# Patient Record
Sex: FEMALE | Race: WHITE | NOT HISPANIC OR LATINO | Employment: OTHER | URBAN - METROPOLITAN AREA
[De-identification: names, ages, dates, MRNs, and addresses within clinical notes are randomized per-mention and may not be internally consistent; named-entity substitution may affect disease eponyms.]

---

## 2024-07-19 ENCOUNTER — HOSPITAL ENCOUNTER (EMERGENCY)
Facility: CLINIC | Age: 69
Discharge: HOME | End: 2024-07-20
Attending: EMERGENCY MEDICINE | Admitting: EMERGENCY MEDICINE
Payer: MEDICARE

## 2024-07-19 VITALS
HEIGHT: 67 IN | WEIGHT: 200.18 LBS | HEART RATE: 104 BPM | SYSTOLIC BLOOD PRESSURE: 143 MMHG | OXYGEN SATURATION: 95 % | RESPIRATION RATE: 18 BRPM | TEMPERATURE: 98.5 F | BODY MASS INDEX: 31.42 KG/M2 | DIASTOLIC BLOOD PRESSURE: 82 MMHG

## 2024-07-19 DIAGNOSIS — N30.00 ACUTE CYSTITIS WITHOUT HEMATURIA: ICD-10-CM

## 2024-07-19 DIAGNOSIS — T50.905A MEDICATION REACTION, INITIAL ENCOUNTER: ICD-10-CM

## 2024-07-19 PROCEDURE — 99283 EMERGENCY DEPT VISIT LOW MDM: CPT

## 2024-07-19 RX ORDER — NITROFURANTOIN 25; 75 MG/1; MG/1
100 CAPSULE ORAL 2 TIMES DAILY
Qty: 14 CAPSULE | Refills: 0 | Status: SHIPPED | OUTPATIENT
Start: 2024-07-19

## 2024-07-19 ASSESSMENT — ACTIVITIES OF DAILY LIVING (ADL)
ADLS_ACUITY_SCORE: 33
ADLS_ACUITY_SCORE: 35

## 2024-07-19 ASSESSMENT — COLUMBIA-SUICIDE SEVERITY RATING SCALE - C-SSRS
2. HAVE YOU ACTUALLY HAD ANY THOUGHTS OF KILLING YOURSELF IN THE PAST MONTH?: NO
1. IN THE PAST MONTH, HAVE YOU WISHED YOU WERE DEAD OR WISHED YOU COULD GO TO SLEEP AND NOT WAKE UP?: NO
6. HAVE YOU EVER DONE ANYTHING, STARTED TO DO ANYTHING, OR PREPARED TO DO ANYTHING TO END YOUR LIFE?: NO

## 2024-07-19 NOTE — ED TRIAGE NOTES
Pt presents to the ED with complaint  of itching all over after taking a sulfa antibiotic an hour ago. Pt on antibiotic for UTI and started it today. Pt denies rash or breathing problems.

## 2024-07-19 NOTE — ED PROVIDER NOTES
"  Emergency Department Note      History of Present Illness     Chief Complaint   Medication Reaction      HPI   Virginia Ferrer is a 69 year old female with a history of CVA, hypertension, and type 2 diabetes mellitus who presents to the ED with a medication reaction. The patient reports she was prescribed Bactrim for a UTI at her yearly check-up. She took one dose today and endorses itching in arms and legs since. She endorses dysuria with UTI. She denies rash.     Independent Historian   None      Past Medical History     Medical History and Problem List   CVA  Hypothyroidism  Hypertension  Chronic pain of right knee  Type 2 diabetes mellitus without complication, without long-term current use of insulin    Medications   Empagliflozin  Sulfamethoxazole-trimethoprim  Amlodipine  Glimepiride  Hydralazine  Levothyroxine  Metoprolol succinate  Rosuvastatin    Surgical History   Gastric Bypass    Physical Exam     Patient Vitals for the past 24 hrs:   BP Temp Temp src Pulse Resp SpO2 Height Weight   07/19/24 1742 (!) 143/82 98.5  F (36.9  C) Temporal 104 18 95 % 1.702 m (5' 7\") 90.8 kg (200 lb 2.8 oz)     Physical Exam  General: Patient is alert and cooperative.  HENT:  No lip or tongue swelling.   Neck:  Normal range of motion and appearance.   Cardiovascular:  Normal rate.   Pulmonary/Chest:  Effort normal.   Musculoskeletal: Normal range of motion. No edema or tenderness.  Skin: Warm and dry. No rash.   Psychiatric: Normal mood and affect. Normal behavior and judgement.      Diagnostics     Lab Results   Labs Ordered and Resulted from Time of ED Arrival to Time of ED Departure - No data to display    Imaging   No orders to display       Independent Interpretation   None    ED Course      Medications Administered   Medications - No data to display      Discussion of Management   None    ED Course   ED Course as of 07/19/24 1855 Fri Jul 19, 2024 1813 I obtained history and examined the patient as noted above. "       Optional/Additional Documentation  None    Medical Decision Making / Diagnosis     CMS Diagnoses: None    MIPS       None    MDM   Virginia Ferrer is a 69 year old female with acute diffuse pruritus with no associated rash.  This began after taking her first dose of Bactrim, given for a recently diagnosed urinary tract infection.  She has had no fever or flank pain and has an unremarkable exam.  No medical interventions were necessary.  Plans are to discontinue Bactrim and prescribe a 7-day course of Macrobid.  She has a history of what sounds like near anaphylaxis with penicillins so cephalosporins are relatively contraindicated.  Care everywhere was reviewed, there is a urine culture pending and she does have normal renal function.    Disposition   The patient was discharged.     Diagnosis     ICD-10-CM    1. Medication reaction, initial encounter  T50.905A       2. Acute cystitis without hematuria  N30.00            Discharge Medications   New Prescriptions    NITROFURANTOIN MACROCRYSTAL-MONOHYDRATE (MACROBID) 100 MG CAPSULE    Take 1 capsule (100 mg) by mouth 2 times daily         Scribe Disclosure:  I, Subhash Lacy, am serving as a scribe at 6:54 PM on 7/19/2024 to document services personally performed by Kenny Leonard MD based on my observations and the provider's statements to me.        Kenny Leonard MD  07/19/24 2050

## 2024-07-20 ASSESSMENT — ACTIVITIES OF DAILY LIVING (ADL)
ADLS_ACUITY_SCORE: 35
ADLS_ACUITY_SCORE: 35

## 2024-10-16 ENCOUNTER — HOSPITAL ENCOUNTER (EMERGENCY)
Facility: CLINIC | Age: 69
Discharge: HOME | End: 2024-10-16
Attending: EMERGENCY MEDICINE | Admitting: EMERGENCY MEDICINE
Payer: MEDICARE

## 2024-10-16 VITALS
WEIGHT: 193.56 LBS | RESPIRATION RATE: 20 BRPM | HEIGHT: 67 IN | DIASTOLIC BLOOD PRESSURE: 91 MMHG | SYSTOLIC BLOOD PRESSURE: 149 MMHG | OXYGEN SATURATION: 100 % | HEART RATE: 99 BPM | BODY MASS INDEX: 30.38 KG/M2 | TEMPERATURE: 97.5 F

## 2024-10-16 DIAGNOSIS — N30.01 ACUTE CYSTITIS WITH HEMATURIA: ICD-10-CM

## 2024-10-16 LAB
ALBUMIN UR-MCNC: 200 MG/DL
APPEARANCE UR: ABNORMAL
BILIRUB UR QL STRIP: NEGATIVE
COLOR UR AUTO: YELLOW
GLUCOSE UR STRIP-MCNC: >=1000 MG/DL
HGB UR QL STRIP: ABNORMAL
KETONES UR STRIP-MCNC: NEGATIVE MG/DL
LEUKOCYTE ESTERASE UR QL STRIP: ABNORMAL
NITRATE UR QL: NEGATIVE
PH UR STRIP: 5.5 [PH] (ref 5–7)
RBC URINE: 37 /HPF
SP GR UR STRIP: 1.02 (ref 1–1.03)
SQUAMOUS EPITHELIAL: 9 /HPF
UROBILINOGEN UR STRIP-MCNC: NORMAL MG/DL
WBC CLUMPS #/AREA URNS HPF: PRESENT /HPF
WBC URINE: >182 /HPF

## 2024-10-16 PROCEDURE — 81001 URINALYSIS AUTO W/SCOPE: CPT | Performed by: EMERGENCY MEDICINE

## 2024-10-16 PROCEDURE — 99283 EMERGENCY DEPT VISIT LOW MDM: CPT

## 2024-10-16 PROCEDURE — 250N000013 HC RX MED GY IP 250 OP 250 PS 637: Performed by: EMERGENCY MEDICINE

## 2024-10-16 PROCEDURE — 87181 SC STD AGAR DILUTION PER AGT: CPT | Performed by: EMERGENCY MEDICINE

## 2024-10-16 RX ORDER — CIPROFLOXACIN 500 MG/1
500 TABLET, FILM COATED ORAL 2 TIMES DAILY
Qty: 6 TABLET | Refills: 0 | Status: SHIPPED | OUTPATIENT
Start: 2024-10-16 | End: 2024-10-19

## 2024-10-16 RX ORDER — SULFAMETHOXAZOLE AND TRIMETHOPRIM 800; 160 MG/1; MG/1
1 TABLET ORAL 2 TIMES DAILY
Qty: 14 TABLET | Refills: 0 | Status: SHIPPED | OUTPATIENT
Start: 2024-10-16 | End: 2024-10-16

## 2024-10-16 RX ORDER — SULFAMETHOXAZOLE AND TRIMETHOPRIM 800; 160 MG/1; MG/1
1 TABLET ORAL ONCE
Status: COMPLETED | OUTPATIENT
Start: 2024-10-16 | End: 2024-10-16

## 2024-10-16 RX ADMIN — SULFAMETHOXAZOLE AND TRIMETHOPRIM 1 TABLET: 800; 160 TABLET ORAL at 17:57

## 2024-10-16 ASSESSMENT — COLUMBIA-SUICIDE SEVERITY RATING SCALE - C-SSRS
6. HAVE YOU EVER DONE ANYTHING, STARTED TO DO ANYTHING, OR PREPARED TO DO ANYTHING TO END YOUR LIFE?: NO
1. IN THE PAST MONTH, HAVE YOU WISHED YOU WERE DEAD OR WISHED YOU COULD GO TO SLEEP AND NOT WAKE UP?: NO
2. HAVE YOU ACTUALLY HAD ANY THOUGHTS OF KILLING YOURSELF IN THE PAST MONTH?: NO

## 2024-10-16 ASSESSMENT — ACTIVITIES OF DAILY LIVING (ADL): ADLS_ACUITY_SCORE: 35

## 2024-10-16 NOTE — ED PROVIDER NOTES
"Emergency Department Note      Code Status: No Order    History of Present Illness     Chief Complaint:  Abdominal Pain and Dysuria       HPI   Virginia Ferrer is a 69 year old female with a history of hypertension, hypothyroidism, and type II diabetes who presents for an evaluation of abdominal pain and dysuria. The patient reported that she had been experiencing dysuria for the past three. Specifically characterizes her dysuria as a burning sensation. She observed blood when using the restroom this morning, but is unsure if it originates in the stool or urine. She states experiencing pain in the central quadrant of her abdomen as well as her back. Further notes feeling constipated, with her last bowel movement occurring 4 days ago. The patient took a stool softener with no relief. Denies fevers, nausea, vomiting, or diarrhea.    Independent Historian:    Son -he notes that the patient does live with him but she had not specifically mentioned discomfort with urination to him until today.    Review of External Notes  None    Past Medical History   Medical History, Surgical History, Problem List, and Medications  Reviewed in Epic    Physical Exam   Patient Vitals for the past 24 hrs:   BP Temp Temp src Pulse Resp SpO2 Height Weight   10/16/24 1622 (!) 149/91 97.5  F (36.4  C) Temporal 99 20 100 % 1.689 m (5' 6.5\") 87.8 kg (193 lb 9 oz)       Physical Exam  Constitutional: Vital signs reviewed as above.   Eyes: PEERL, EOMI B/L  Neck: No JVD noted. FROM   Cardiovascular: normal rate, Regular rhythm and normal heart sounds.  No murmur heard. Equal B/L peripheral pulses.  Pulmonary/Chest: Effort normal and breath sounds normal. No respiratory distress. Patient has no wheezes. Patient has no rales.   Gastrointestinal: Soft. There is suprapubic tenderness.   Musculoskeletal/Extremities: No pitting edema noted. Normal tone.  Skin: Skin is warm and dry. There is no diaphoresis noted.   Psychiatric: The patient appears " calm.     Diagnostics     Laboratory: Imaging:   Labs Ordered and Resulted from Time of ED Arrival to Time of ED Departure   ROUTINE UA WITH MICROSCOPIC REFLEX TO CULTURE - Abnormal       Result Value    Color Urine Yellow      Appearance Urine Cloudy (*)     Glucose Urine >=1000 (*)     Bilirubin Urine Negative      Ketones Urine Negative      Specific Gravity Urine 1.020      Blood Urine Moderate (*)     pH Urine 5.5      Protein Albumin Urine 200 (*)     Urobilinogen Urine Normal      Nitrite Urine Negative      Leukocyte Esterase Urine Large (*)     WBC Clumps Urine Present (*)     RBC Urine 37 (*)     WBC Urine >182 (*)     Squamous Epithelials Urine 9 (*)    URINE CULTURE     No orders to display         Independent Interpretation  See ED course    ED Course    Medications Administered  Medications   sulfamethoxazole-trimethoprim (BACTRIM DS) 800-160 MG per tablet 1 tablet (1 tablet Oral $Given 10/16/24 5783)       Procedures  Procedures     Discussion of Management  See ED Course    ED Course  ED Course as of 10/16/24 2342   Wed Oct 16, 2024   1714 I obtained the history and evaluated the patient as noted above.    1846 The patient's son called noting the patient feels itchy. She was given a Bactrim dose here.  I instructed her to take Benadryl.  She can return as needed.  I will change the antibiotic to cipro given the listed allergy to penicillin as well as Macrobid.  The patient's son notes she is tolerated Cipro in the past.       Optional/Additional Documentation: None    Medical Decision Making / Diagnosis     MIPS     None    Medical Decision Making:  This 69-year-old presents due to dysuria.  Please see the HPI and exam for specifics.  The patient denies fevers.  There are no physical exam signs of pyelonephritis.  Urinalysis is concerning for infection and I will prescribe antibiotics.  The first dose will be given in the ED.  I think that other intra-abdominal abnormalities are unlikely.  There is  no significant right lower or left lower quadrant pain.  The patient does note some constipation and has now started taking stool softeners.    I think she can safely be discharged to follow-up with primary care.  The patient and her son note that there seems to be a recurrence pattern in her UTIs so I will have them talk with primary care but also consider seeing urology in an outpatient setting.    Critical Care:  None.    Disposition:  See ED Course and MDM    ICD-10 Codes:    ICD-10-CM    1. Acute cystitis with hematuria  N30.01            Discharge Medications:  Discharge Medication List as of 10/16/2024  5:58 PM        START taking these medications    Details   sulfamethoxazole-trimethoprim (BACTRIM DS) 800-160 MG tablet Take 1 tablet by mouth 2 times daily for 7 days., Disp-14 tablet, R-0, E-Prescribe              10/16/2024   Je Delgado DO     [employer]     Scribe Disclosure:  I, Laith Michaud, am serving as a scribe at 5:13 PM on 10/16/2024 to document services personally performed by Je Delgado DO based on my observations and the provider's statements to me.      Je Delgado DO  10/16/24 0073       Je Delgado DO  10/16/24 3066

## 2024-10-16 NOTE — ED TRIAGE NOTES
Pt sent here from  pt has been having painful frequent urination for three days she states that there is blood either in her stool or urine she is not sure.  Pt states that she was constipated and started a stool softer and has since had blood.  Pt states she has had burning and itching for three days      Triage Assessment (Adult)       Row Name 10/16/24 8939          Triage Assessment    Airway WDL WDL        Respiratory WDL    Respiratory WDL WDL        Skin Circulation/Temperature WDL    Skin Circulation/Temperature WDL WDL        Cardiac WDL    Cardiac WDL WDL        Peripheral/Neurovascular WDL    Peripheral Neurovascular WDL WDL        Cognitive/Neuro/Behavioral WDL    Cognitive/Neuro/Behavioral WDL WDL

## 2024-10-17 ENCOUNTER — TELEPHONE (OUTPATIENT)
Dept: NURSING | Facility: CLINIC | Age: 69
End: 2024-10-17
Payer: MEDICARE

## 2024-10-17 NOTE — LETTER
October 17, 2024        Virginia Ferrer  [address]  [place] [address]          Dear Virginia Ferrer:    You were seen in the [facility] Emergency Department at [facility] on 10/16/2024.  We are unable to reach you by phone, so we are sending you this letter.     It is important that you call [facility] Emergency Department lab result nurse at [phone], as we have information to relay to you AND/OR we MAY have to make some changes in your treatment.    Best time to call back is between 9AM and 5:30PM, 7 days a week.      Sincerely,     [facility] Emergency Department Lab Result RN  [phone]

## 2024-10-17 NOTE — TELEPHONE ENCOUNTER
"[facility]    Reason for call: Lab Result Notification     Lab Result (including Rx patient on, if applicable).  If culture, copy of lab report at bottom.  Lab Result: Urine Culture - See Below    ED Rx: ciprofloxacin (CIPRO) 500 MG tablet - Take 1 tablet (500 mg) by mouth 2 times daily for 3 days (NOT TESTED)    Creatinine Level (mg/dl) No results found for: \"CR\" Creatinine clearance (ml/min), if applicable    Creatinine clearance cannot be calculated (No successful lab value found.)     ED Symptoms: Presented to the ED with dysuria and abdominal pain.      Current Symptoms: Unable to assess. Susceptibility for Cipro requested with the lab since patient is allergic to PCN.     10/17/2024 @1610:Patient and her son calling back. Patient's son states that he is unhappy with the care he got in the ED. He states that his mom was given an antibiotic that she was allergic to as started to itch. States that he called the ED and they advised she take a benadryl and then Cipro was sent in.     Patient states she feels ok. States that she is still having some burning with urination but denies frequency.  States that they just picked up the Cipro at 11 am today.  Advised that susceptibilites were requested for Cipro and we would be calling back when the culture re-results. Patient and her son verbalized understanding. They were warm transferred to patient relations to discuss their visit.     RN Recommendations/Instructions per [place] ED lab result protocol:   [facility] ED lab result protocol utilized: Urine Culture    Unable to reach patient/caregiver.     Left voicemail message requesting a call back to [phone] between 9 a.m. and 5:30 p.m. for patient's ED/UC lab results.      Letter pended to be sent via [employer] mail.        ONEAL NOLASCO RN  "

## 2024-10-19 ENCOUNTER — TELEPHONE (OUTPATIENT)
Dept: NURSING | Facility: CLINIC | Age: 69
End: 2024-10-19
Payer: MEDICARE

## 2024-10-19 LAB — BACTERIA UR CULT: ABNORMAL

## 2024-10-19 NOTE — TELEPHONE ENCOUNTER
"[facility]    Reason for call: Lab Result Notification     Lab Result (including Rx patient on, if applicable).  If culture, copy of lab report at bottom.  Lab Result: Final Urine Culture Report on 10/19/24  [facility] Emergency Dept discharge antibiotic prescribed: Ciprofloxacin (Cipro) 500 mg tablet, 1 tablet (500 mg) by mouth 2 times daily for 3 days.   Bacterial Growth: >100,000 CFU/ML Streptococcus agalactiae (Group B Streptococcus) [No confirming susceptibility report to Cipro]      Creatinine Level (mg/dl) No results found for: \"CR\" Creatinine clearance (ml/min), if applicable    Creatinine clearance cannot be calculated (No successful lab value found.)        Allergies   Allergen Reactions    Codeine     Penicillins     Nitrofurantoin Itching and Rash      Patient's current Symptoms:   At 11:49A, Left voicemail message requesting a call back to [facility] ED Lab Result RN at [phone]. RN is available every day between 9 a.m. and 5:30 p.m.   Letter sent    RN Recommendations/Instructions per [place] ED lab result protocol:   Treatment recommendations per urine culture protocol        Kilo Null RN   "

## 2024-10-19 NOTE — LETTER
October 19, 2024        Virginia Ferrer  [address]  [place] [address]          Dear Virginia Ferrer:    You were seen in the [facility] Emergency Department at [facility] on 10/16/2024.  We are unable to reach you by phone, so we are sending you this letter.     It is important that you call [facility] Emergency Department lab result nurse at [phone], as we have information to relay to you AND/OR we MAY have to make some changes in your treatment.    Best time to call back is between 9AM and 5:30PM, 7 days a week.      Sincerely,     [facility] Emergency Department Lab Result RN  [phone]

## 2024-10-19 NOTE — TELEPHONE ENCOUNTER
"[facility]    Reason for call: Lab Result Notification     Lab Result (including Rx patient on, if applicable).  If culture, copy of lab report at bottom.  Lab Result:    Final Urine Culture Report on 10/19/24  [facility] Emergency Dept discharge antibiotic prescribed: Ciprofloxacin (Cipro) 500 mg tablet, 1 tablet (500 mg) by mouth 2 times daily for 3 days.   Bacterial Growth: >100,000 CFU/ML Streptococcus agalactiae (Group B Streptococcus) [No confirming susceptibility report to Cipro]     Contacted lab at 1240 to see if more information about Cipro and Group B strep.  They were not able to give any more information than what was already reported out.    Creatinine Level (mg/dl) No results found for: \"CR\" Creatinine clearance (ml/min), if applicable    Creatinine clearance cannot be calculated (No successful lab value found.)     Patient's current Symptoms:   Sx's are getting better. No blood in urine today  Has 1 more day on the Ciprofloxacin    RN Recommendations/Instructions per [place] ED lab result protocol:   [facility] ED lab result protocol utilized: urine culture    Offered cefpodoxime per protocol but she has had allergy reaction to penicillin in the past.  (Reaction to Penicillin was swelling of the tongue).  Virginia made aware that there is only a slight possibility (5%) of having a reaction to Cefpodoxime.   She has had recent reaction to Nitrofurantoin and Bactrim DS and did not want to try another antibiotic.    Sx's are improving so she was encouraged to discuss have PCP retest urine this coming week.      Patient/care giver notified to contact your PCP clinic or return to the Emergency department if your:  Symptoms do not resolve after completing antibiotic.  Symptoms worsen or other concerning symptoms.    Kilo Null RN   "

## 2024-10-27 ENCOUNTER — HOSPITAL ENCOUNTER (EMERGENCY)
Facility: CLINIC | Age: 69
Discharge: HOME | End: 2024-10-27
Attending: EMERGENCY MEDICINE | Admitting: EMERGENCY MEDICINE
Payer: MEDICARE

## 2024-10-27 VITALS
HEART RATE: 102 BPM | SYSTOLIC BLOOD PRESSURE: 152 MMHG | DIASTOLIC BLOOD PRESSURE: 94 MMHG | WEIGHT: 194.45 LBS | RESPIRATION RATE: 20 BRPM | HEIGHT: 67 IN | OXYGEN SATURATION: 97 % | BODY MASS INDEX: 30.52 KG/M2 | TEMPERATURE: 99.8 F

## 2024-10-27 DIAGNOSIS — N39.0 ACUTE LOWER UTI: ICD-10-CM

## 2024-10-27 DIAGNOSIS — R30.0 DYSURIA: ICD-10-CM

## 2024-10-27 LAB
ALBUMIN UR-MCNC: 600 MG/DL
APPEARANCE UR: ABNORMAL
BILIRUB UR QL STRIP: NEGATIVE
COLOR UR AUTO: ABNORMAL
GLUCOSE UR STRIP-MCNC: >=1000 MG/DL
HGB UR QL STRIP: ABNORMAL
KETONES UR STRIP-MCNC: NEGATIVE MG/DL
LEUKOCYTE ESTERASE UR QL STRIP: ABNORMAL
NITRATE UR QL: NEGATIVE
PH UR STRIP: 6.5 [PH] (ref 5–7)
RBC URINE: >182 /HPF
SP GR UR STRIP: 1.03 (ref 1–1.03)
UROBILINOGEN UR STRIP-MCNC: NORMAL MG/DL
WBC URINE: >182 /HPF

## 2024-10-27 PROCEDURE — 87088 URINE BACTERIA CULTURE: CPT | Performed by: EMERGENCY MEDICINE

## 2024-10-27 PROCEDURE — 81001 URINALYSIS AUTO W/SCOPE: CPT | Performed by: EMERGENCY MEDICINE

## 2024-10-27 PROCEDURE — 250N000013 HC RX MED GY IP 250 OP 250 PS 637: Performed by: EMERGENCY MEDICINE

## 2024-10-27 PROCEDURE — 99283 EMERGENCY DEPT VISIT LOW MDM: CPT

## 2024-10-27 RX ORDER — HYDROCODONE BITARTRATE AND ACETAMINOPHEN 5; 325 MG/1; MG/1
2 TABLET ORAL ONCE
Status: COMPLETED | OUTPATIENT
Start: 2024-10-27 | End: 2024-10-27

## 2024-10-27 RX ORDER — CEPHALEXIN 500 MG/1
500 CAPSULE ORAL ONCE
Status: COMPLETED | OUTPATIENT
Start: 2024-10-27 | End: 2024-10-27

## 2024-10-27 RX ORDER — CEPHALEXIN 500 MG/1
500 CAPSULE ORAL 2 TIMES DAILY
Qty: 14 CAPSULE | Refills: 0 | Status: SHIPPED | OUTPATIENT
Start: 2024-10-27 | End: 2024-11-03

## 2024-10-27 RX ADMIN — HYDROCODONE BITARTRATE AND ACETAMINOPHEN 2 TABLET: 5; 325 TABLET ORAL at 20:05

## 2024-10-27 RX ADMIN — CEPHALEXIN 500 MG: 500 CAPSULE ORAL at 20:05

## 2024-10-27 ASSESSMENT — ACTIVITIES OF DAILY LIVING (ADL): ADLS_ACUITY_SCORE: 0

## 2024-10-28 NOTE — ED PROVIDER NOTES
"    History     Chief Complaint:  Dysuria       HPI   Virginia Ferrer is a 69 year old female seen on the 16th for UTI like sx.  Appears home with bactrim by ER note, phone triage possibly cipro 3 days.  However reviewing culture results group B strep.  Needs a cephalosporin.  Here tonight dysuria burning.  No fever or flank pain or abd pain NVD.  Has hematuria.        Independent Historian:    Discussing with family sounds like bactrim then maybe cipro and knows they needed cephalexin and got owrried about allx reaction.  Where PCN is regarded as swelling in EMR.      Review of External Notes:  ER note 10.16.24 and 7.19.24 burning with urination.   Collected 10/16/2024  4:43 PM       Status: Edited Result - FINAL       Visible to patient: No (inaccessible in MyChart)    Specimen Information: Urine, Midstream   1 Result Note  Culture >100,000 CFU/mL Streptococcus agalactiae (Group B Streptococcus) Abnormal    This organism is susceptible to ampicillin, penicillin, vancomycin and the cephalosporins. If treatment is required AND your patient is allergic to penicillin, contact the Microbiology Lab within 5 days to request susceptibility testing.        Medications:    cephALEXin (KEFLEX) 500 MG capsule  nitroFURantoin macrocrystal-monohydrate (MACROBID) 100 MG capsule        Past Medical History:    No past medical history on file.    Past Surgical History:    No past surgical history on file.       Physical Exam   Patient Vitals for the past 24 hrs:   BP Temp Temp src Pulse Resp SpO2 Height Weight   10/27/24 1911 (!) 152/94 99.8  F (37.7  C) Temporal 102 20 97 % 1.702 m (5' 7\") 88.2 kg (194 lb 7.1 oz)        Physical Exam  General: Patient is well appearing. No distress.  Head: Atraumatic.  Eyes: Conjunctivae and EOM are normal. No scleral icterus.  Neck: Normal range of motion. Neck supple.   Cardiovascular: Normal rate, regular rhythm, normal heart sounds and intact distal pulses.   Pulmonary/Chest: Breath " sounds normal. No respiratory distress.  Abdominal: Soft. Bowel sounds are normal. No distension. No tenderness. No rebound or guarding.   Musculoskeletal: Normal range of motion.  Skin: Warm and dry. No rash noted. Not diaphoretic.      Emergency Department Course   ECG      Imaging:  No orders to display       Laboratory:  Labs Ordered and Resulted from Time of ED Arrival to Time of ED Departure   ROUTINE UA WITH MICROSCOPIC REFLEX TO CULTURE - Abnormal       Result Value    Color Urine Red (*)     Appearance Urine Cloudy (*)     Glucose Urine >=1000 (*)     Bilirubin Urine Negative      Ketones Urine Negative      Specific Gravity Urine 1.030      Blood Urine Large (*)     pH Urine 6.5      Protein Albumin Urine 600 (*)     Urobilinogen Urine Normal      Nitrite Urine Negative      Leukocyte Esterase Urine Large (*)     RBC Urine >182 (*)     WBC Urine >182 (*)    URINE CULTURE        Procedures       Emergency Department Course & Assessments:    Interventions:  Medications   cephALEXin (KEFLEX) capsule 500 mg (has no administration in time range)   HYDROcodone-acetaminophen (NORCO) 5-325 MG per tablet 2 tablet (has no administration in time range)        Assessments:      Independent Interpretation (X-rays, CTs, rhythm strip):      Consultations/Discussion of Management or Tests:         Social Drivers of Health affecting care:       Disposition:  The patient was discharged.    Impression & Plan           Medical Decision Making:  Virginia Ferrer is a 69 year old female seen on the 16th for UTI like sx.  Appears home with bactrim by ER note, phone triage possibly cipro 3 days.  However reviewing culture results group B strep.  Needs a cephalosporin.  Here tonight dysuria burning.  No fever or flank pain or abd pain NVD.  Not septic or ill.  Will add cephalexin and resend culture here and rec actual in person PCP follow up.  She is scheduled on the Nov5th.      Diagnosis:    ICD-10-CM    1. Dysuria  R30.0        2. Acute lower UTI  N39.0            Discharge Medications:  New Prescriptions    CEPHALEXIN (KEFLEX) 500 MG CAPSULE    Take 1 capsule (500 mg) by mouth 2 times daily for 7 days.            10/27/2024   Milo Welch MD Stevens, Andrew C, MD  10/27/24 1959

## 2024-10-29 ENCOUNTER — TELEPHONE (OUTPATIENT)
Dept: NURSING | Facility: CLINIC | Age: 69
End: 2024-10-29
Payer: MEDICARE

## 2024-10-29 LAB — BACTERIA UR CULT: ABNORMAL

## 2024-10-29 NOTE — TELEPHONE ENCOUNTER
"[facility]    Reason for call: Lab Result Notification     Lab Result (including Rx patient on, if applicable).  If culture, copy of lab report at bottom.  Lab Result: Urine Culture - See Below    ED Rx: cephALEXin (KEFLEX) 500 MG capsule - Take 1 capsule (500 mg) by mouth 2 times daily for 7 days (SUSCEPTIBLE)    Creatinine Level (mg/dl) No results found for: \"CR\" Creatinine clearance (ml/min), if applicable    Creatinine clearance cannot be calculated (No successful lab value found.)     ED Symptoms: Presented to the ED with dysuria.    Current Symptoms: Patient states she does not have any burning anymore.  Seeing her PCP on November 5th.     RN Recommendations/Instructions per [place] ED lab result protocol:   [facility] ED lab result protocol utilized: Urine Culture-Continue current plan of care.     Patient/care giver notified to contact your PCP clinic or return to the Emergency department if your:  Symptoms do not improve after 3 days on antibiotic.  Symptoms do not resolve after completing antibiotic.  Symptoms worsen or other concerning symptoms.       ONEAL NOLASCO RN  "